# Patient Record
Sex: FEMALE | Race: WHITE | NOT HISPANIC OR LATINO
[De-identification: names, ages, dates, MRNs, and addresses within clinical notes are randomized per-mention and may not be internally consistent; named-entity substitution may affect disease eponyms.]

---

## 2022-07-27 PROBLEM — Z00.00 ENCOUNTER FOR PREVENTIVE HEALTH EXAMINATION: Status: ACTIVE | Noted: 2022-07-27

## 2022-08-02 ENCOUNTER — APPOINTMENT (OUTPATIENT)
Dept: ORTHOPEDIC SURGERY | Facility: CLINIC | Age: 77
End: 2022-08-02

## 2022-08-02 VITALS — WEIGHT: 134 LBS | HEIGHT: 61 IN | BODY MASS INDEX: 25.3 KG/M2

## 2022-08-02 PROCEDURE — 99203 OFFICE O/P NEW LOW 30 MIN: CPT

## 2022-08-02 PROCEDURE — 72100 X-RAY EXAM L-S SPINE 2/3 VWS: CPT

## 2022-08-02 PROCEDURE — 99213 OFFICE O/P EST LOW 20 MIN: CPT

## 2022-08-02 NOTE — PHYSICAL EXAM
[Normal Mood and Affect] : normal mood and affect [Orientated] : orientated [Able to Communicate] : able to communicate [Well Developed] : well developed [Well Nourished] : well nourished [NL (90)] : forward flexion 90 degrees [NL (30)] : right lateral bending 30 degrees [Extension] : extension [Facet arthropathy] : Facet arthropathy [Disc space narrowing] : Disc space narrowing [Scoliosis] : Scoliosis [] : non-antalgic [FreeTextEntry1] : Multilevel DDD from L2-S1 and degen facet arthritis moderate rt sided degen scoliosis.

## 2022-08-02 NOTE — HISTORY OF PRESENT ILLNESS
[Lower back] : lower back [Gradual] : gradual [5] : 5 [4] : 4 [Dull/Aching] : dull/aching [Constant] : constant [Rest] : rest [Sitting] : sitting [Standing] : standing [Walking] : walking [Exercising] : exercising [Retired] : Work status: retired [de-identified] : 8/2/22  Return visit for this 77 year female c/o spon. onset of increased LBP x last 1 weeks duration. Spoke with her MD brother who placed her on a MDP which she stopped after 3 days due to N/V and heartburn. Localized to LB. NO leg pain. Worse sitting. NO wake up pain at night. LBP is a "8-9".\par \par PMH: Hx of LBP x many years . [] : Post Surgical Visit: no [FreeTextEntry1] : L spine  [FreeTextEntry5] : pt claims her lower back has always hurt but recently the pain has been getting worse [FreeTextEntry6] : most pain is after exercising, legs feel heavy when walking [FreeTextEntry9] : laying down [de-identified] : 2+ years [de-identified] : xrays [de-identified] : Physcial therapy [TWNoteComboBox1] : 80%

## 2023-01-19 ENCOUNTER — APPOINTMENT (OUTPATIENT)
Dept: ORTHOPEDIC SURGERY | Facility: CLINIC | Age: 78
End: 2023-01-19
Payer: MEDICARE

## 2023-01-19 VITALS — WEIGHT: 130 LBS | BODY MASS INDEX: 24.55 KG/M2 | HEIGHT: 61 IN

## 2023-01-19 DIAGNOSIS — M47.816 SPONDYLOSIS W/OUT MYELOPATHY OR RADICULOPATHY, LUMBAR REGION: ICD-10-CM

## 2023-01-19 DIAGNOSIS — M51.36 OTHER INTERVERTEBRAL DISC DEGENERATION, LUMBAR REGION: ICD-10-CM

## 2023-01-19 DIAGNOSIS — E78.00 PURE HYPERCHOLESTEROLEMIA, UNSPECIFIED: ICD-10-CM

## 2023-01-19 DIAGNOSIS — I51.9 HEART DISEASE, UNSPECIFIED: ICD-10-CM

## 2023-01-19 DIAGNOSIS — Z78.9 OTHER SPECIFIED HEALTH STATUS: ICD-10-CM

## 2023-01-19 PROCEDURE — 99213 OFFICE O/P EST LOW 20 MIN: CPT

## 2023-01-19 RX ORDER — METHYLPREDNISOLONE 4 MG/1
4 TABLET ORAL
Qty: 1 | Refills: 1 | Status: ACTIVE | COMMUNITY
Start: 2023-01-19 | End: 1900-01-01

## 2023-01-19 NOTE — HISTORY OF PRESENT ILLNESS
[7] : 7 [8] : 8 [Dull/Aching] : dull/aching [Retired] : Work status: retired [de-identified] : 01/19/23:  Returns today for a complaint of recurrent lower back pain  x last 2 weeks duration. radiating to both anterior thighs. Worse sitting long periods and walking.\par \par 8/2/22  Return visit for this 77 year female c/o spon. onset of increased LBP x last 1 weeks duration. Spoke with her MD brother who placed her on a MDP which she stopped after 3 days due to N/V and heartburn. Localized to LB. NO leg pain. Worse sitting. NO wake up pain at night. LBP is a "8-9".\par \par PMH: Hx of LBP x many years . [FreeTextEntry1] : low back [de-identified] : PT